# Patient Record
Sex: MALE | Race: WHITE
[De-identification: names, ages, dates, MRNs, and addresses within clinical notes are randomized per-mention and may not be internally consistent; named-entity substitution may affect disease eponyms.]

---

## 2020-06-21 ENCOUNTER — HOSPITAL ENCOUNTER (EMERGENCY)
Dept: HOSPITAL 38 - CC.ED | Age: 20
Discharge: HOME | End: 2020-06-21
Payer: COMMERCIAL

## 2020-06-21 DIAGNOSIS — S70.01XA: Primary | ICD-10-CM
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
## 2020-06-21 PROCEDURE — 73522 X-RAY EXAM HIPS BI 3-4 VIEWS: CPT

## 2020-06-21 PROCEDURE — 99283 EMERGENCY DEPT VISIT LOW MDM: CPT

## 2020-06-21 RX ADMIN — KETOROLAC TROMETHAMINE ONE PACKET: 10 TABLET, FILM COATED ORAL at 16:42

## 2020-06-21 NOTE — EDM.PDOC
ED HPI GENERAL MEDICAL PROBLEM





- General


Chief Complaint: Lower Extremity Injury/Pain


Stated Complaint: RIGHT HIP PAIN


Time Seen by Provider: 06/21/20 15:43


Source of Information: Reports: Patient


History Limitations: Reports: No Limitations





- History of Present Illness


INITIAL COMMENTS - FREE TEXT/NARRATIVE: 





Patient to the emergency department where he was in the shower slipped and fell 

landing on his right buttock/hip area a few days ago he advises that he is to 

has pain to that area.  The patient denies any back or neck pain he denies any 

loss of consciousness.  The patient denies any lower extremity pain other than 

the hip pain.  The patient has no bruising or swelling to the area.  It is 

tender with palpation.  The patient is able to ambulate without problems other 

than tenderness in the hip area.  There is no numbness or tingling.


Duration: Day(s): (Few days ago)


Location: Reports: Lower Extremity, Right (Right hip pain)


Quality: Reports: Ache


Severity: Mild


Improves with: Reports: None


Worsens with: Reports: Other (Worsen with ambulation)


Context: Reports: Other (L in the shower)


Associated Symptoms: Denies: Chest Pain, Fever/Chills, Headaches, Nausea/Vom

iting, Weakness


Treatments PTA: Reports: Other (see below) (none)


  ** Right Hip


Pain Score (Numeric/FACES): 6





- Related Data


                                    Allergies











Allergy/AdvReac Type Severity Reaction Status Date / Time


 


No Known Allergies Allergy   Verified 06/21/20 15:04











Home Meds: 


                                    Home Meds





. [No Known Home Meds]  06/21/20 [History]











Past Medical History





- Past Health History


Medical/Surgical History: Denies Medical/Surgical History





- Past Surgical History


Head Surgeries/Procedures: Reports: None





Social & Family History





- Family History


Family Medical History: Noncontributory





- Tobacco Use


Smoking Status *Q: Never Smoker





- Caffeine Use


Caffeine Use: Reports: Coffee





- Recreational Drug Use


Recreational Drug Use: No





Review of Systems





- Review of Systems


Review Of Systems: See Below


Constitutional: Reports: No Symptoms


Eyes: Reports: No Symptoms


Respiratory: Reports: No Symptoms.  Denies: Shortness of Breath


Cardiovascular: Reports: No Symptoms.  Denies: Chest Pain


GI/Abdominal: Reports: No Symptoms.  Denies: Abdominal Pain, Nausea, Vomiting


Genitourinary: Reports: No Symptoms.  Denies: Dysuria, Hematuria


Musculoskeletal: Reports: Joint Pain (Right hip pain).  Denies: Neck Pain, Back 

Pain


Skin: Reports: No Symptoms.  Denies: Bruising, Erythema


Neurological: Reports: No Symptoms.  Denies: Headache, Numbness, Tingling, 

Weakness


Psychiatric: Reports: No Symptoms





ED EXAM, GENERAL





- Physical Exam


Exam: See Below


Exam Limited By: No Limitations


General Appearance: Alert, WD/WN, No Apparent Distress


Head: Atraumatic, Normocephalic


Neck: Normal Inspection, Supple, Non-Tender, Full Range of Motion


Respiratory/Chest: No Respiratory Distress, Lungs Clear, Normal Breath Sounds, 

Chest Non-Tender


Cardiovascular: Normal Peripheral Pulses, Regular Rate, Rhythm, No Murmur


Peripheral Pulses: 2+: Radial (L), Radial (R), Posterior Tibial (R)


GI/Abdominal: Soft, Non-Tender


Back Exam: Normal Inspection, Full Range of Motion.  No: Vertebral Tenderness


Extremities: Normal Inspection, Normal Range of Motion, Normal Capillary Refill,

 Other (Tenderness over the lateral right hip with palpation from the trochanter

 area)


Neurological: Alert, Oriented, Normal Cognition, Normal Gait, No Motor/Sensory 

Deficits


Psychiatric: Normal Affect, Normal Mood


Skin Exam: Warm, Dry, Intact, Normal Color





Course





- Vital Signs


Text/Narrative:: 





The patient was evaluated in the emergency department the patient did have a x-

ray of the bilateral hip and pelvis which was read by the radiologist as 

negative see the radiology report for complete details.  The patient will be 

given Toradol 10 mg 3 times daily as needed he will be advised to follow with 

his family doctor this coming week for further evaluation and treatment if the 

pain continues the patient was advised that he will need to have a CT of the hip

 and pelvis for further evaluation as sometimes these types of fractures do not 

necessarily appear on plain x-ray.  Few days one would suspect that a hairline 

fracture would pronounce itself at this point.


Last Recorded V/S: 





                                Last Vital Signs











Temp  36.7 C   06/21/20 15:46


 


Pulse  67   06/21/20 15:46


 


Resp  16   06/21/20 15:46


 


BP  125/71   06/21/20 15:46


 


Pulse Ox  98   06/21/20 15:46














- Orders/Labs/Meds


Orders: 





                               Active Orders 24 hr











 Category Date Time Status


 


 Hip Min 3V or 4V w Pelvis Bi [CR] Stat Exams  06/21/20 15:10 Taken














Departure





- Departure


Time of Disposition: 16:17


Disposition: Home, Self-Care 01


Clinical Impression: 


 Fall, Contusion of right hip








- Discharge Information


*PRESCRIPTION DRUG MONITORING PROGRAM REVIEWED*: Not Applicable


*COPY OF PRESCRIPTION DRUG MONITORING REPORT IN PATIENT ROEGR: Not Applicable


Instructions:  Contusion, Easy-to-Read, Hip Pain


Additional Instructions: 


Increase fluids


Toradol 10 mg every 8 hours as needed for pain


Warm moist heat off-and-on frequently to your right hip


Light duty for the next 2 days


Follow-up with your family doctor this coming week for further evaluation; and 

possible additional imaging including a CT if symptoms persist


Return to the emergency department sooner if worse or any problems





Sepsis Event Note (ED)





- Evaluation


Sepsis Screening Result: No Definite Risk





- Focused Exam


Vital Signs: 





                                   Vital Signs











  Temp Pulse Resp BP Pulse Ox


 


 06/21/20 15:46  36.7 C  67  16  125/71  98


 


 06/21/20 15:05  36.7 C  67  16  125/71  98














- Problem List & Annotations


(1) Contusion of right hip


SNOMED Code(s): 48289423


   Code(s): S70.01XA - CONTUSION OF RIGHT HIP, INITIAL ENCOUNTER   Status: Acute

   Priority: Medium   


Qualifiers: 


   Encounter type: initial encounter   Qualified Code(s): S70.01XA - Contusion 

of right hip, initial encounter   





(2) Fall


SNOMED Code(s): 6256520, 590035169


   Code(s): W19.XXXA - UNSPECIFIED FALL, INITIAL ENCOUNTER   Status: Acute   

Priority: Medium   


Qualifiers: 


   Encounter type: initial encounter   Qualified Code(s): W19.XXXA - Unspecified

 fall, initial encounter   





- Problem List Review


Problem List Initiated/Reviewed/Updated: Yes





- My Orders


Last 24 Hours: 





My Active Orders





06/21/20 15:10


Hip Min 3V or 4V w Pelvis Bi [CR] Stat 














- Assessment/Plan


Last 24 Hours: 





My Active Orders





06/21/20 15:10


Hip Min 3V or 4V w Pelvis Bi [CR] Stat 











Plan: 





As above

## 2022-02-19 ENCOUNTER — HOSPITAL ENCOUNTER (EMERGENCY)
Dept: HOSPITAL 41 - JD.ED | Age: 22
Discharge: HOME | End: 2022-02-19
Payer: COMMERCIAL

## 2022-02-19 DIAGNOSIS — J45.909: ICD-10-CM

## 2022-02-19 DIAGNOSIS — U07.1: Primary | ICD-10-CM

## 2022-02-19 LAB
S PYO DNA THROAT QL NAA+PROBE: NOT DETECTED
SARS-COV-2 RNA RESP QL NAA+PROBE: POSITIVE

## 2022-02-19 PROCEDURE — 87651 STREP A DNA AMP PROBE: CPT

## 2022-02-19 PROCEDURE — 0240U: CPT

## 2022-02-19 PROCEDURE — 71045 X-RAY EXAM CHEST 1 VIEW: CPT

## 2022-02-19 PROCEDURE — 99283 EMERGENCY DEPT VISIT LOW MDM: CPT
